# Patient Record
Sex: MALE | HISPANIC OR LATINO | Employment: FULL TIME | URBAN - METROPOLITAN AREA
[De-identification: names, ages, dates, MRNs, and addresses within clinical notes are randomized per-mention and may not be internally consistent; named-entity substitution may affect disease eponyms.]

---

## 2019-02-21 ENCOUNTER — OFFICE VISIT (OUTPATIENT)
Dept: CARDIOLOGY | Facility: CLINIC | Age: 49
End: 2019-02-21

## 2019-02-21 VITALS
DIASTOLIC BLOOD PRESSURE: 63 MMHG | WEIGHT: 178 LBS | SYSTOLIC BLOOD PRESSURE: 107 MMHG | BODY MASS INDEX: 26.36 KG/M2 | HEART RATE: 56 BPM | HEIGHT: 69 IN

## 2019-02-21 DIAGNOSIS — E78.5 HYPERLIPIDEMIA, UNSPECIFIED HYPERLIPIDEMIA TYPE: ICD-10-CM

## 2019-02-21 DIAGNOSIS — F43.9 STRESS: ICD-10-CM

## 2019-02-21 DIAGNOSIS — R00.2 PALPITATIONS: Primary | ICD-10-CM

## 2019-02-21 PROCEDURE — 99203 OFFICE O/P NEW LOW 30 MIN: CPT | Performed by: INTERNAL MEDICINE

## 2019-02-21 PROCEDURE — 93000 ELECTROCARDIOGRAM COMPLETE: CPT | Performed by: INTERNAL MEDICINE

## 2019-04-22 ENCOUNTER — HOSPITAL ENCOUNTER (EMERGENCY)
Age: 49
Discharge: HOME | End: 2019-04-22
Attending: EMERGENCY MEDICINE
Payer: COMMERCIAL

## 2019-04-22 VITALS
OXYGEN SATURATION: 98 % | HEART RATE: 81 BPM | SYSTOLIC BLOOD PRESSURE: 131 MMHG | TEMPERATURE: 98.6 F | DIASTOLIC BLOOD PRESSURE: 83 MMHG | RESPIRATION RATE: 22 BRPM

## 2019-04-22 DIAGNOSIS — R00.2 PALPITATIONS: Primary | ICD-10-CM

## 2019-04-22 DIAGNOSIS — F41.1 ANXIETY STATE: ICD-10-CM

## 2019-04-22 LAB
EKG ATRIAL RATE: 98 BPM
EKG DIAGNOSIS: NORMAL
EKG P AXIS: 45 DEGREES
EKG P-R INTERVAL: 168 MS
EKG Q-T INTERVAL: 336 MS
EKG QRS DURATION: 88 MS
EKG QTC CALCULATION (BAZETT): 428 MS
EKG R AXIS: 55 DEGREES
EKG T AXIS: 45 DEGREES
EKG VENTRICULAR RATE: 98 BPM

## 2019-04-22 PROCEDURE — 99284 EMERGENCY DEPT VISIT MOD MDM: CPT

## 2019-04-22 PROCEDURE — 93005 ELECTROCARDIOGRAM TRACING: CPT | Performed by: EMERGENCY MEDICINE

## 2019-04-22 SDOH — HEALTH STABILITY: MENTAL HEALTH: HOW OFTEN DO YOU HAVE A DRINK CONTAINING ALCOHOL?: NEVER

## 2019-04-22 ASSESSMENT — ENCOUNTER SYMPTOMS
CHEST TIGHTNESS: 0
GASTROINTESTINAL NEGATIVE: 1
ALLERGIC/IMMUNOLOGIC NEGATIVE: 1
SHORTNESS OF BREATH: 0
COUGH: 0
ABDOMINAL PAIN: 0
NAUSEA: 0
EYES NEGATIVE: 1
VOMITING: 0
RESPIRATORY NEGATIVE: 1

## 2019-04-22 NOTE — ED PROVIDER NOTES
[facility] [address]          ATTENDING PHYSICIAN NOTE       Date of evaluation: 4/22/2019    Chief Complaint     Panic Attack and Tachycardia      History of Present Illness     Alison Jaffe is a 46 y.o. male who presents feeling anxious, and concerned about tachycardia and palpitations. The patient states that he for many years his experience panic attacks and anxiety, with episodes of palpitations and racing heart. He states that he has been to the emergency Department number of times because of this, and his evaluations are always benign. He states that he has had Holter monitor several times, as well as stress test and other cardiac evaluations, and has only been found to have some ectopy. He has seen a cardiologist, who told him that his sensation of palpitations and ectopy, related to stress, and was benign. All of this was done in the patient's home town in [place], the records are visible through care everywhere, including his most recent cardiology note from 2 years ago. The patient states that he is currently in the process of moving from [facility] back to [place], packed up his boxes and carrying them down to his car this morning, and was driving back to [place] today. Patient states that he awoke very early at 4 AM to pack up and begin the moving process. After he was done with all of moving and carrying of boxes, he states that his heart rate was in the 120s. As he was driving, his heart rate never came down below the 100s, he became concerned. He states that he took a dose of clonazepam, which he had not needed for a couple of years, but still felt as though his heart rate were elevated, so he stopped driving and came to this emergency department for evaluation. Patient states that he still feels the sensation of his heart rate being somewhat fast, although he feels that it has improved. He denies any chest pain. He denies any shortness of breath. He denies any dizziness or lightheadedness. He does note that he has been under more stress recently, and that his lack of sleep is likely contributing to his symptoms. On further discussion with the patient, he states that this particular episode is typical for him, and that the symptoms he is experiencing the same that he usually associates with stress. Review of Systems     Review of Systems   Constitutional: Negative for activity change, appetite change, chills and fever. HENT: Negative. Eyes: Negative. Respiratory: Negative. Negative for cough, chest tightness and shortness of breath. Cardiovascular: Positive for palpitations. Negative for chest pain and leg swelling. Gastrointestinal: Negative. Negative for abdominal pain, nausea and vomiting. Endocrine: Negative. Genitourinary: Negative. Musculoskeletal: Negative. Skin: Negative. Allergic/Immunologic: Negative. Neurological: Negative. Negative for dizziness, weakness, light-headedness and headaches. Hematological: Negative. Psychiatric/Behavioral: The patient is nervous/anxious. Past Medical, Surgical, Family, and Social History     He has a past medical history of Anxiety. He has no past surgical history on file. His family history is not on file. He reports that he has never smoked. He has never used smokeless tobacco. He reports that he does not drink alcohol or use drugs. Medications     Previous Medications    CLONAZEPAM (KLONOPIN)    Take by mouth 2 times daily. Allergies     He has No Known Allergies. Physical Exam     INITIAL VITALS: BP: (!) 135/90, Temp: 98.6 °F (37 °C), Pulse: 112, Resp: 19, SpO2: 99 %     General: Well appearing. Calm, pleasant and conversational, in no acute distress. HEENT: Pupils are equal, round, and reactive to light. Extraocular muscles are intact. Conjunctivae are clear and moist. No redness or drainage from the eyes. No drainage from the nose.      Neck: Medical Hx, Past Surgical Hx, Social Hx, Allergies, and Family Hx were reviewed. The patient was given the following medications:  No orders of the defined types were placed in this encounter. CONSULTS:  None    MEDICAL DECISION MAKING / ASSESSMENT / PLAN     Michelle Bryant is a 46 y.o. male with a long-standing history of anxiety, panic attacks, and palpitations associated with this, with multiple negative Cardiologic workups in the past, primarily through the [address] [place] [address] system which is available through care everywhere, who presents with another similar episode, this time while in the process of moving from [place] back to [place]. .  The patient was mildly tachycardic on arrival, with some sensation of palpitations, but otherwise asymptomatic. EKG showed normal sinus rhythm with no concerning findings. The patient's was placed on continuous telemetry monitoring, and maintain normal sinus rhythm, with a gradually decreasing heart rate into the 80s. He not have any concerning symptoms which suggested a need for repeat Cardiologic evaluation. The patient had gradual resolution of the symptoms while under observation in the emergency department, and stated that he would like to resume his drive to [place]. At that point, he was considered to be stable for discharge, with continued outpatient Cardiologic follow-up through the [address] [place] [address],[facility] if necessary. Clinical Impression     1. Palpitations    2. Anxiety state        Disposition     PATIENT REFERRED TO:  Darren Springer MD  [address]  [phone]      As needed      DISCHARGE MEDICATIONS:  New Prescriptions    No medications on file       DISPOSITION Decision To Discharge    (Please note that portions of this note were completed with voice recognition software.   Efforts were made to edit the dictations but occasionally words are mis-transcribed.)        Radha Christie MD  04/22/19 6922

## 2019-04-22 NOTE — ED NOTES
Patient prepared for and ready to be discharged. Patient discharged at this time in no acute distress after verbalizing understanding of discharge instructions. Patient left after receiving After Visit Summary instructions.         Nancyann Kussmaul, RN  04/22/19 8672

## 2022-01-05 NOTE — ED NOTES
Patient reports anxiety started today while moving. Hx. Of anxiety, \"I have panic attacks with a fast heart rate\". Reports HR at home between 110-130. Patient denies chest pain, SOB.  on arrival. Respirations easy and unlabored.       Santi Evans RN  04/22/19 9522 Nosebleeds Normal Treatment: I explained this is common when taking isotretinoin. I recommended saline mist in each nostril multiple times a day. If this worsens they will contact us. Retinoid Dermatitis Aggressive Treatment: I recommended more frequent application of Cetaphil or CeraVe to the areas of dermatitis. I also prescribed a topical steroid for twice daily use until the dermatitis resolves. Myalgia Monitoring: I explained this is common when taking isotretinoin. If this worsens they will contact us. Xerosis Normal Treatment: I recommended application of Cetaphil or CeraVe numerous times a day and before going to bed to all dry areas. Myalgia Treatment: I explained this is common when taking isotretinoin. If this worsens they will contact us. They may try OTC ibuprofen. Add High Risk Medication Management Associated Diagnosis?: No Are Labs Available For Review?: No- Labs Deferred This Month Cheilitis Normal Treatment: I recommended application of Vaseline or Aquaphor numerous times a day (as often as every hour) and before going to bed. Xerosis Aggressive Treatment: I recommended application of Cetaphil or CeraVe numerous times a day and before going to bed to all dry areas. I also prescribed a topical steroid for twice daily use. Ipledge Number (Optional): [id number] Cheilitis Aggressive Treatment: I recommended application of Vaseline or Aquaphor numerous times a day (as often as every hour) and before going to bed. I also prescribed a topical steroid for twice daily use. Next Month's Dosage: Discontinue Weight Units: pounds Hypercholesterolemia Monitoring: I explained this is common when taking isotretinoin. We will monitor closely. Counseling Text: I reviewed the side effect in detail. Patient should get monthly blood tests, not donate blood, not drive at night if vision affected, and not share medication. Female Pregnancy Counseling Text: Female patients should also be on two forms of birth control while taking this medication and for one month after their last dose. Headache Monitoring: I recommended monitoring the headaches for now. There is no evidence of increased intracranial pressure. They were instructed to call if the headaches are worsening. Patient Weight (Optional But Required For Cumulative Dose-Numbers And Decimals Only): 114 Months Of Therapy Completed: 4 Xerosis Normal Treatment: I recommended application of Cetaphil or CeraVe numerous times a day going to bed to all dry areas. Use Therapeutic Ranged Or Therapeutic Target: please select Range or Target Xerosis Aggressive Treatment: I recommended application of Cetaphil or CeraVe numerous times a day going to bed to all dry areas. I also prescribed a topical steroid for twice daily use. Dosing Month 1 (Required For Cumulative Dosing): 40mg Daily Retinoid Dermatitis Normal Treatment: I recommended more frequent application of Cetaphil or CeraVe to the areas of dermatitis. Upper Range (In Mg/Kg): 150 Lower Range (In Mg/Kg): 120 Pounds Preamble Statement (Weight Entered In Details Tab): Reported Weight in pounds: Target Cumulative Dosage (In Mg/Kg): 135 Dosing Month 2 (Required For Cumulative Dosing): 40mg BID Female Completion Statement: After discussing her treatment course we decided to discontinue isotretinoin therapy at this time. I explained that she would need to continue her birth control methods for at least one month after the last dosage. She should also get a pregnancy test one month after the last dose. She shouldn't donate blood for one month after the last dose. She should call with any new symptoms of depression. Kilograms Preamble Statement (Weight Entered In Details Tab): Reported Weight in kilograms: Detail Level: Zone Male Completion Statement: After discussing his treatment course we decided to discontinue isotretinoin therapy at this time. He shouldn't donate blood for one month after the last dose. He should call with any new symptoms of depression. Show Text Field For Brand Names Of Contraception?: Yes What Is The Patient's Gender: Male